# Patient Record
Sex: FEMALE | Race: OTHER | NOT HISPANIC OR LATINO | URBAN - METROPOLITAN AREA
[De-identification: names, ages, dates, MRNs, and addresses within clinical notes are randomized per-mention and may not be internally consistent; named-entity substitution may affect disease eponyms.]

---

## 2018-08-21 ENCOUNTER — EMERGENCY (EMERGENCY)
Facility: HOSPITAL | Age: 38
LOS: 1 days | Discharge: HOME | End: 2018-08-21
Attending: EMERGENCY MEDICINE | Admitting: EMERGENCY MEDICINE
Payer: COMMERCIAL

## 2018-08-21 VITALS
RESPIRATION RATE: 16 BRPM | DIASTOLIC BLOOD PRESSURE: 65 MMHG | OXYGEN SATURATION: 99 % | HEART RATE: 85 BPM | TEMPERATURE: 100 F | SYSTOLIC BLOOD PRESSURE: 100 MMHG

## 2018-08-21 PROCEDURE — 99283 EMERGENCY DEPT VISIT LOW MDM: CPT

## 2018-08-21 PROCEDURE — 71046 X-RAY EXAM CHEST 2 VIEWS: CPT | Mod: 26

## 2018-08-21 RX ORDER — ACETAMINOPHEN 500 MG
650 TABLET ORAL ONCE
Qty: 0 | Refills: 0 | Status: COMPLETED | OUTPATIENT
Start: 2018-08-21 | End: 2018-08-21

## 2018-08-21 RX ADMIN — Medication 650 MILLIGRAM(S): at 09:41

## 2018-08-21 NOTE — ED ADULT TRIAGE NOTE - CHIEF COMPLAINT QUOTE
c/o fever t-max (105.0)  x 5 days, hoarse voice, productive cough with green sputum, was seen by [facility] on Sunday where a chest xray was performed and dx with lower lobe PNA and bronchitis, was placed on antibiotics with minimal relief.  Denies any PMH

## 2018-08-21 NOTE — ED PROVIDER NOTE - OBJECTIVE STATEMENT
39 y/o female with c/o fever, prod cough x5 days, on augmentin x3 days, states minimal improvement.  Seen at [facility], [place], who performed cxr and told her she had lll pna.   Denies ha, neck stiffness, cp, sob, abd pain, n/v/d, dysuria, rash.

## 2018-08-21 NOTE — ED PROVIDER NOTE - MEDICAL DECISION MAKING DETAILS
39 y/o female with fever, cough x5 days.  Seen at [facility], told had pna.  Clear lungs and afebrile here.  Will obtain cxr, give acetaminophen, reassess, antic dc with pcp f/u.